# Patient Record
Sex: FEMALE | Race: OTHER | Employment: UNEMPLOYED | URBAN - METROPOLITAN AREA
[De-identification: names, ages, dates, MRNs, and addresses within clinical notes are randomized per-mention and may not be internally consistent; named-entity substitution may affect disease eponyms.]

---

## 2022-03-02 ENCOUNTER — EMERGENCY VISIT (OUTPATIENT)
Dept: URBAN - METROPOLITAN AREA CLINIC 47 | Facility: CLINIC | Age: 57
End: 2022-03-02

## 2022-03-02 DIAGNOSIS — T15.02XA: ICD-10-CM

## 2022-03-02 PROCEDURE — 92012 INTRM OPH EXAM EST PATIENT: CPT

## 2022-03-02 PROCEDURE — 65222 REMOVE FOREIGN BODY FROM EYE: CPT

## 2022-03-02 ASSESSMENT — VISUAL ACUITY
OS_SC: 20/20
OD_SC: 20/20-1

## 2022-03-29 ENCOUNTER — EMERGENCY VISIT (OUTPATIENT)
Dept: URBAN - METROPOLITAN AREA CLINIC 36 | Facility: CLINIC | Age: 57
End: 2022-03-29

## 2022-03-29 DIAGNOSIS — T15.02XA: ICD-10-CM

## 2022-03-29 PROCEDURE — 92012 INTRM OPH EXAM EST PATIENT: CPT

## 2022-03-29 RX ORDER — NEOMYCIN SULFATE, POLYMYXIN B SULFATE AND DEXAMETHASONE 3.5; 10000; 1 MG/ML; [USP'U]/ML; MG/ML: 1 SUSPENSION OPHTHALMIC

## 2022-03-29 ASSESSMENT — VISUAL ACUITY
OS_SC: 20/60
OD_CC: 20/25

## 2022-03-29 ASSESSMENT — TONOMETRY: OS_IOP_MMHG: 10

## 2022-03-29 NOTE — PATIENT DISCUSSION
Small FB was removed at the slit lamp with cotton swab, coated hard to identify. Eyelid everted no large FB found.